# Patient Record
Sex: MALE | URBAN - METROPOLITAN AREA
[De-identification: names, ages, dates, MRNs, and addresses within clinical notes are randomized per-mention and may not be internally consistent; named-entity substitution may affect disease eponyms.]

---

## 2020-10-28 ENCOUNTER — OFFICE VISIT (OUTPATIENT)
Dept: URBAN - METROPOLITAN AREA CLINIC 27 | Facility: CLINIC | Age: 85
End: 2020-10-28
Payer: MEDICARE

## 2020-10-28 PROCEDURE — 67028 INJECTION EYE DRUG: CPT | Performed by: OPHTHALMOLOGY

## 2020-10-28 PROCEDURE — 92134 CPTRZ OPH DX IMG PST SGM RTA: CPT | Performed by: OPHTHALMOLOGY

## 2020-10-28 PROCEDURE — 92014 COMPRE OPH EXAM EST PT 1/>: CPT | Performed by: OPHTHALMOLOGY

## 2020-10-28 ASSESSMENT — INTRAOCULAR PRESSURE
OD: 13
OS: 14

## 2020-10-28 NOTE — IMPRESSION/PLAN
Impression: Macular degeneration, dry OD. Status: Symptomatic. Plan: The patient notes distortion. Exam and OCT reveal no IRF. An IVFA from 009/02/2020 demonstrated no leakage. Fundus Photos from 09/02/2020 demonstrated drusen. An ICGA from 05/06/2020 demonstrated no polyps. Observe. Discussed AREDS / I Vit and Marci.

## 2020-10-28 NOTE — IMPRESSION/PLAN
Impression: Visual distortion, OD. Status: Stable. s/p anesthesia needle injury (2005) Plan: Chronically poor [facility] OD. Observe.

## 2020-10-28 NOTE — IMPRESSION/PLAN
Impression: Macular degeneration, wet OS. Status: Symptomatic.
s/p Lucentis x 49 last 09/30/2020 (consented 06/17/2020) Plan: The patient notes continued distortion. Exam and OCT reveal a PED with mildly disrupted retinal architecture OS. An IVFA from 09/02/2020 demonstrated leakage in the central macula. Fundus Photos from 09/02/2020 demonstrated drusen. An ICGA from 05/06/2020 demonstrated polyps. Recommend Lucentis. R/B/A discussed with patient. Patient elects to proceed with Lucentis 0.5mg today. After 2% Subconjunctival anesthesia & Betadine prep, Lucentis was injected in the eye with no complications. Overfill discarded. Cold compress and Tylenol suggested for pain if needed. Thanks, Alison Ruiz Return in 1 month re eval Nv AMD, OCT OU, ICG OS 1st

## 2020-12-02 ENCOUNTER — OFFICE VISIT (OUTPATIENT)
Dept: URBAN - METROPOLITAN AREA CLINIC 27 | Facility: CLINIC | Age: 85
End: 2020-12-02
Payer: MEDICARE

## 2020-12-02 DIAGNOSIS — H04.123 DRY EYE SYNDROME OF BILATERAL LACRIMAL GLANDS: ICD-10-CM

## 2020-12-02 DIAGNOSIS — H35.3221 EXDTVE AGE-REL MCLR DEGN, LEFT EYE, WITH ACTV CHRDL NEOVAS: Primary | ICD-10-CM

## 2020-12-02 PROCEDURE — 92014 COMPRE OPH EXAM EST PT 1/>: CPT | Performed by: OPHTHALMOLOGY

## 2020-12-02 PROCEDURE — 92134 CPTRZ OPH DX IMG PST SGM RTA: CPT | Performed by: OPHTHALMOLOGY

## 2020-12-02 PROCEDURE — 67028 INJECTION EYE DRUG: CPT | Performed by: OPHTHALMOLOGY

## 2020-12-02 PROCEDURE — 92240 ICG ANGIOGRAPHY I&R UNI/BI: CPT | Performed by: OPHTHALMOLOGY

## 2020-12-02 ASSESSMENT — INTRAOCULAR PRESSURE
OS: 13
OD: 14

## 2020-12-02 NOTE — IMPRESSION/PLAN
Impression: Macular degeneration, wet OS. Status: Symptomatic.
s/p Lucentis x 50 last 10/28/2020 (consented 06/17/2020) Plan: Exam and OCT reveal a PED with mildly disrupted retinal architecture OS. An IVFA from 09/02/2020 demonstrated leakage in the central macula. Fundus Photos from 12/2/2020 demonstrated drusen. An ICGA from 12/2/2020 demonstrated polyps. Recommend Lucentis. R/B/A discussed with patient. Patient elects to proceed with Lucentis 0.5mg today. After 2% Subconjunctival anesthesia & Betadine prep, Lucentis was injected in the eye with no complications. Overfill discarded. Cold compress and Tylenol suggested for pain if needed. Thanks, Graham Mayorga Return in 1 month re eval Nv AMD, OCT OU.

## 2020-12-02 NOTE — IMPRESSION/PLAN
Impression: Macular degeneration, dry OD. Status: Symptomatic. Plan: The patient notes distortion. Exam and OCT reveal no IRF. An IVFA from 09/02/2020 demonstrated no leakage. Fundus Photos from 12/2/2020 demonstrated drusen. An ICGA from 12/2/2020 demonstrated no polyps. Observe. Discussed AREDS / I Vit and Dorisler.

## 2021-01-08 ENCOUNTER — OFFICE VISIT (OUTPATIENT)
Dept: URBAN - METROPOLITAN AREA CLINIC 27 | Facility: CLINIC | Age: 86
End: 2021-01-08
Payer: MEDICARE

## 2021-01-08 PROCEDURE — 92014 COMPRE OPH EXAM EST PT 1/>: CPT | Performed by: OPHTHALMOLOGY

## 2021-01-08 PROCEDURE — 67028 INJECTION EYE DRUG: CPT | Performed by: OPHTHALMOLOGY

## 2021-01-08 PROCEDURE — 92134 CPTRZ OPH DX IMG PST SGM RTA: CPT | Performed by: OPHTHALMOLOGY

## 2021-01-08 ASSESSMENT — INTRAOCULAR PRESSURE
OD: 12
OS: 13

## 2021-01-08 NOTE — IMPRESSION/PLAN
Impression: Macular degeneration, wet OS. Status: Symptomatic.
s/p Lucentis x 51 last 12/2/2020 (consented 06/17/2020) Plan: The patient notes blurring. Exam and OCT reveal a PED with mildly disrupted retinal architecture OS. An IVFA from 09/02/2020 demonstrated leakage in the central macula. Fundus Photos from 12/2/2020 demonstrated drusen. An ICGA from 12/2/2020 demonstrated polyps. Recommend Lucentis. R/B/A discussed with patient. Patient elects to proceed with Lucentis 0.5mg today. After 2% Subconjunctival anesthesia & Betadine prep, Lucentis was injected in the eye with no complications. Overfill discarded. Cold compress and Tylenol suggested for pain if needed. Thanks, Tuan Tse Return in 1 month re eval Nv AMD, OCT OU.

## 2021-02-05 ENCOUNTER — OFFICE VISIT (OUTPATIENT)
Dept: URBAN - METROPOLITAN AREA CLINIC 27 | Facility: CLINIC | Age: 86
End: 2021-02-05
Payer: MEDICARE

## 2021-02-05 DIAGNOSIS — H35.3114 NEXDTVE AGE-REL MCLR DEGN, R EYE, ADV ATRPC W SBFVL INVOLV: ICD-10-CM

## 2021-02-05 PROCEDURE — 92134 CPTRZ OPH DX IMG PST SGM RTA: CPT | Performed by: OPHTHALMOLOGY

## 2021-02-05 PROCEDURE — 67028 INJECTION EYE DRUG: CPT | Performed by: OPHTHALMOLOGY

## 2021-02-05 PROCEDURE — 92014 COMPRE OPH EXAM EST PT 1/>: CPT | Performed by: OPHTHALMOLOGY

## 2021-02-05 ASSESSMENT — INTRAOCULAR PRESSURE
OS: 14
OD: 12

## 2021-02-05 NOTE — IMPRESSION/PLAN
Impression: Macular degeneration, wet OS. Status: Symptomatic.
s/p Lucentis x 52 last 01/08/21 (consented 06/17/2020) Plan: The patient notes continued blurring. Exam and OCT reveal a PED with mildly disrupted retinal architecture OS. An IVFA from 09/02/2020 demonstrated leakage in the central macula. Fundus Photos from 12/2/2020 demonstrated drusen. An ICGA from 12/2/2020 demonstrated polyps. Recommend Lucentis. R/B/A discussed with patient. Patient elects to proceed with Lucentis 0.5mg today. After 2% Subconjunctival anesthesia & Betadine prep, Lucentis was injected in the eye with no complications. Overfill discarded. Cold compress and Tylenol suggested for pain if needed. Thanks, [employer] Return in 1 month re eval Nv AMD, OCT OU.

## 2021-03-05 ENCOUNTER — OFFICE VISIT (OUTPATIENT)
Dept: URBAN - METROPOLITAN AREA CLINIC 27 | Facility: CLINIC | Age: 86
End: 2021-03-05
Payer: MEDICARE

## 2021-03-05 DIAGNOSIS — H43.811 VITREOUS DEGENERATION, RIGHT EYE: ICD-10-CM

## 2021-03-05 DIAGNOSIS — H35.3211 EXUDATIVE AGE-RELATED MACULAR DEGENERATION, RIGHT EYE, WITH ACTIVE CHOROIDAL NEOVASCULARIZATION: ICD-10-CM

## 2021-03-05 PROCEDURE — 92014 COMPRE OPH EXAM EST PT 1/>: CPT | Performed by: OPHTHALMOLOGY

## 2021-03-05 PROCEDURE — 67028 INJECTION EYE DRUG: CPT | Performed by: OPHTHALMOLOGY

## 2021-03-05 PROCEDURE — 92134 CPTRZ OPH DX IMG PST SGM RTA: CPT | Performed by: OPHTHALMOLOGY

## 2021-03-05 ASSESSMENT — INTRAOCULAR PRESSURE
OD: 13
OS: 12

## 2021-04-09 ENCOUNTER — OFFICE VISIT (OUTPATIENT)
Dept: URBAN - METROPOLITAN AREA CLINIC 27 | Facility: CLINIC | Age: 86
End: 2021-04-09
Payer: MEDICARE

## 2021-04-09 DIAGNOSIS — Z96.1 PRESENCE OF INTRAOCULAR LENS: ICD-10-CM

## 2021-04-09 PROCEDURE — 92134 CPTRZ OPH DX IMG PST SGM RTA: CPT | Performed by: OPHTHALMOLOGY

## 2021-04-09 PROCEDURE — 67028 INJECTION EYE DRUG: CPT | Performed by: OPHTHALMOLOGY

## 2021-04-09 PROCEDURE — 92014 COMPRE OPH EXAM EST PT 1/>: CPT | Performed by: OPHTHALMOLOGY

## 2021-04-09 ASSESSMENT — INTRAOCULAR PRESSURE
OS: 13
OD: 10

## 2021-04-09 NOTE — IMPRESSION/PLAN
Impression: Macular degeneration, wet OS. Status: Symptomatic.
s/p Lucentis x 54 last 03/05/2021 (consented 06/17/2020) Plan: Exam and OCT reveal a PED with mildly disrupted retinal architecture OS. An IVFA from 09/02/2020 demonstrated leakage in the central macula. Fundus Photos from 12/2/2020 demonstrated drusen. An ICGA from 12/2/2020 demonstrated polyps. Recommend Lucentis. R/B/A discussed with patient. Patient elects to proceed with Lucentis 0.5mg today. After 2% Subconjunctival anesthesia & Betadine prep, Lucentis was injected in the eye with no complications. Overfill discarded. Cold compress and Tylenol suggested for pain if needed. Thanks, Kennedy Blush Return in 1 month re eval Nv AMD, OCT OU, possible Lucentis OS, IVFA OS 1st

## 2021-05-19 ENCOUNTER — OFFICE VISIT (OUTPATIENT)
Dept: URBAN - METROPOLITAN AREA CLINIC 27 | Facility: CLINIC | Age: 86
End: 2021-05-19
Payer: MEDICARE

## 2021-05-19 PROCEDURE — 67028 INJECTION EYE DRUG: CPT | Performed by: OPHTHALMOLOGY

## 2021-05-19 PROCEDURE — 92134 CPTRZ OPH DX IMG PST SGM RTA: CPT | Performed by: OPHTHALMOLOGY

## 2021-05-19 PROCEDURE — 92235 FLUORESCEIN ANGRPH MLTIFRAME: CPT | Performed by: OPHTHALMOLOGY

## 2021-05-19 PROCEDURE — 92014 COMPRE OPH EXAM EST PT 1/>: CPT | Performed by: OPHTHALMOLOGY

## 2021-05-19 ASSESSMENT — INTRAOCULAR PRESSURE
OD: 16
OS: 16

## 2021-05-19 NOTE — IMPRESSION/PLAN
Impression: Macular degeneration, dry OD. Status: Symptomatic. Plan: The patient notes distortion. Exam and OCT reveal no IRF. An IVFA from 05/19/2021 demonstrated no leakage. Fundus Photos from 05/19/2021 demonstrated drusen. An ICGA from 12/2/2020 demonstrated no polyps. Observe. Discussed AREDS / I Vit and Amsler.

## 2021-05-19 NOTE — IMPRESSION/PLAN
Impression: Macular degeneration, wet OS. Status: Symptomatic.
s/p Lucentis x 55 last 04/09/2021 Plan: The patient notes blurring. Exam and OCT reveal a PED with mildly disrupted retinal architecture OS. An IVFA from 05/19/2021 demonstrated leakage in the central macula. Fundus Photos from 05/19/2021 demonstrated drusen. An ICGA from 12/2/2020 demonstrated polyps. Recommend Lucentis. R/B/A discussed with patient. Patient elects to proceed with Lucentis 0.5mg today. After 2% Subconjunctival anesthesia & Betadine prep, Lucentis was injected in the eye with no complications. Overfill discarded. Cold compress and Tylenol suggested for pain if needed. Thanks, Tye Shows Return in 1 month re eval Nv AMD, OCT OU, possible Lucentis OS, ICG OS 1st

## 2021-07-02 ENCOUNTER — OFFICE VISIT (OUTPATIENT)
Dept: URBAN - METROPOLITAN AREA CLINIC 27 | Facility: CLINIC | Age: 86
End: 2021-07-02
Payer: MEDICARE

## 2021-07-02 PROCEDURE — 92014 COMPRE OPH EXAM EST PT 1/>: CPT | Performed by: OPHTHALMOLOGY

## 2021-07-02 PROCEDURE — 92134 CPTRZ OPH DX IMG PST SGM RTA: CPT | Performed by: OPHTHALMOLOGY

## 2021-07-02 PROCEDURE — 92240 ICG ANGIOGRAPHY I&R UNI/BI: CPT | Performed by: OPHTHALMOLOGY

## 2021-07-02 PROCEDURE — 67028 INJECTION EYE DRUG: CPT | Performed by: OPHTHALMOLOGY

## 2021-07-02 ASSESSMENT — INTRAOCULAR PRESSURE
OD: 12
OS: 10

## 2021-07-02 NOTE — IMPRESSION/PLAN
Impression: Macular degeneration, dry OD. Status: Symptomatic. Plan: The patient notes distortion. Exam and OCT reveal no IRF. An IVFA from 05/19/2021 demonstrated no leakage. Fundus Photos from 05/19/2021 demonstrated drusen. An ICGA from 07/02/2021 demonstrated no polyps. Observe. Discussed AREDS / I Vit and Amsler.

## 2021-07-02 NOTE — IMPRESSION/PLAN
Impression: Macular degeneration, wet OS. Status: Symptomatic.
s/p Lucentis x 56 last 05/19/2021 Plan: The patient notes continued blurring. Exam and OCT reveal a PED with mildly disrupted retinal architecture OS. An IVFA from 05/19/2021demonstrated leakage in the central macula. Fundus Photos from 05/19/2021 demonstrated drusen. An ICGA from 07/02/2021 demonstrated polyps. Recommend Lucentis. R/B/A discussed with patient. Patient elects to proceed with Lucentis 0.5mg today. After 2% Subconjunctival anesthesia & Betadine prep, Lucentis was injected in the eye with no complications. Overfill discarded. Cold compress and Tylenol suggested for pain if needed. Thanks, Monty Quiñones Return in 1 month re eval Nv AMD, OCT OU, possible Lucentis OS

## 2021-08-20 ENCOUNTER — OFFICE VISIT (OUTPATIENT)
Dept: URBAN - METROPOLITAN AREA CLINIC 27 | Facility: CLINIC | Age: 86
End: 2021-08-20
Payer: MEDICARE

## 2021-08-20 DIAGNOSIS — H53.10 SUBJECTIVE VISUAL DISTURBANCE: ICD-10-CM

## 2021-08-20 PROCEDURE — 67028 INJECTION EYE DRUG: CPT | Performed by: OPHTHALMOLOGY

## 2021-08-20 PROCEDURE — 92014 COMPRE OPH EXAM EST PT 1/>: CPT | Performed by: OPHTHALMOLOGY

## 2021-08-20 PROCEDURE — 92134 CPTRZ OPH DX IMG PST SGM RTA: CPT | Performed by: OPHTHALMOLOGY

## 2021-08-20 ASSESSMENT — INTRAOCULAR PRESSURE
OS: 12
OD: 14

## 2021-08-20 NOTE — IMPRESSION/PLAN
Impression: Macular degeneration, wet OS. Status: Symptomatic.
s/p Lucentis x 567last 07/02/2021 Plan: Exam and OCT reveal a PED with mildly disrupted retinal architecture OS. An IVFA from 05/19/2021demonstrated leakage in the central macula. Fundus Photos from 05/19/2021 demonstrated drusen. An ICGA from 07/02/2021 demonstrated polyps. Recommend Lucentis. R/B/A discussed with patient. Patient elects to proceed with Lucentis 0.5mg today. After 2% Subconjunctival anesthesia & Betadine prep, Lucentis was injected in the eye with no complications. Overfill discarded. Cold compress and Tylenol suggested for pain if needed. Thanks, Carlitos Child Return in 1 month re eval Nv AMD, OCT OU, possible Lucentis OS

## 2021-08-30 NOTE — IMPRESSION/PLAN
Impression: IVÁN ESCOBAR. Status: Symptomatic. Plan: AFTS. Review of Systems:  	•	CONSTITUTIONAL: no fever  	•	SKIN: no rash  	•	RESPIRATORY: no shortness of breath  	•	CARDIAC: no chest pain  	•	GI:  no abd pain, no nausea, no vomiting, no diarrhea  	•	GENITO-URINARY:  no dysuria  	•	MUSCULOSKELETAL:  no back pain  	•	NEUROLOGIC: no weakness  	•	ALLERGY: no rhinorrhea  	•	PSYCHIATRIC: +Hallucinations Review of Systems:  	•	CONSTITUTIONAL: no fever  	•	SKIN: no rash  	•	RESPIRATORY: no shortness of breath  	•	CARDIAC: no chest pain  	•	GI:  no abd pain, no nausea, no vomiting, no diarrhea  	•	GENITO-URINARY:  no dysuria  	•	MUSCULOSKELETAL:  no back pain  	•	NEUROLOGIC: no weakness  	•	ALLERGY: no rhinorrhea  	•	PSYCHIATRIC: drug abuse

## 2021-09-29 ENCOUNTER — OFFICE VISIT (OUTPATIENT)
Dept: URBAN - METROPOLITAN AREA CLINIC 27 | Facility: CLINIC | Age: 86
End: 2021-09-29
Payer: MEDICARE

## 2021-09-29 PROCEDURE — 92134 CPTRZ OPH DX IMG PST SGM RTA: CPT | Performed by: OPHTHALMOLOGY

## 2021-09-29 PROCEDURE — 67028 INJECTION EYE DRUG: CPT | Performed by: OPHTHALMOLOGY

## 2021-09-29 PROCEDURE — 92014 COMPRE OPH EXAM EST PT 1/>: CPT | Performed by: OPHTHALMOLOGY

## 2021-09-29 ASSESSMENT — INTRAOCULAR PRESSURE
OS: 14
OD: 13

## 2021-09-29 NOTE — IMPRESSION/PLAN
Impression: Macular degeneration, wet OS. Status: Symptomatic.
s/p multiple Lucentis last 08/20/2021 Plan: Exam and OCT reveal a PED OS. An IVFA from 05/19/2021demonstrated leakage in the central macula. Fundus Photos from 05/19/2021 demonstrated drusen. An ICGA from 07/02/2021 demonstrated polyps. Recommend Lucentis. R/B/A discussed with patient. Patient elects to proceed with Lucentis 0.5mg today. After 2% Subconjunctival anesthesia & Betadine prep, Lucentis was injected in the eye with no complications. Overfill discarded. Cold compress and Tylenol suggested for pain if needed. Thanks, Norval Boxer Return in 1 month re eval Nv AMD, OCT OU, possible Lucentis OS

## 2021-10-29 ENCOUNTER — OFFICE VISIT (OUTPATIENT)
Dept: URBAN - METROPOLITAN AREA CLINIC 27 | Facility: CLINIC | Age: 86
End: 2021-10-29
Payer: MEDICARE

## 2021-10-29 PROCEDURE — 92014 COMPRE OPH EXAM EST PT 1/>: CPT | Performed by: OPHTHALMOLOGY

## 2021-10-29 PROCEDURE — 92134 CPTRZ OPH DX IMG PST SGM RTA: CPT | Performed by: OPHTHALMOLOGY

## 2021-10-29 PROCEDURE — 67028 INJECTION EYE DRUG: CPT | Performed by: OPHTHALMOLOGY

## 2021-10-29 ASSESSMENT — INTRAOCULAR PRESSURE
OD: 15
OS: 17

## 2021-10-29 NOTE — IMPRESSION/PLAN
Impression: Macular degeneration, wet OS. Status: Symptomatic.
s/p multiple Lucentis last 09/29/2021  Plan: The patient notes blurring. Exam and OCT reveal a PED OS. An IVFA from 05/19/2021demonstrated leakage in the central macula. Fundus Photos from 05/19/2021 demonstrated drusen. An ICGA from 07/02/2021 demonstrated polyps. Recommend Lucentis. R/B/A discussed with patient. Patient elects to proceed with Lucentis 0.5mg today. After 2% Subconjunctival anesthesia & Betadine prep, Lucentis was injected in the eye with no complications. Overfill discarded. Cold compress and Tylenol suggested for pain if needed. Thanks, Tuan Tse Return in 1 month re eval Nv AMD, OCT OU, possible Lucentis OS

## 2022-01-14 ENCOUNTER — OFFICE VISIT (OUTPATIENT)
Dept: URBAN - METROPOLITAN AREA CLINIC 27 | Facility: CLINIC | Age: 87
End: 2022-01-14
Payer: MEDICARE

## 2022-01-14 PROCEDURE — 92134 CPTRZ OPH DX IMG PST SGM RTA: CPT | Performed by: OPHTHALMOLOGY

## 2022-01-14 PROCEDURE — 92014 COMPRE OPH EXAM EST PT 1/>: CPT | Performed by: OPHTHALMOLOGY

## 2022-01-14 PROCEDURE — 67028 INJECTION EYE DRUG: CPT | Performed by: OPHTHALMOLOGY

## 2022-01-14 ASSESSMENT — INTRAOCULAR PRESSURE
OS: 14
OD: 11

## 2022-01-14 NOTE — IMPRESSION/PLAN
Impression: Macular degeneration, wet OS. Status: Symptomatic.
s/p multiple Lucentis last 10/29/2021 Plan: The patient notes continued blurring. Exam and OCT reveal a PED OS. An IVFA from 05/19/2021demonstrated leakage in the central macula. Fundus Photos from 05/19/2021 demonstrated drusen. An ICGA from 07/02/2021 demonstrated polyps. Recommend Lucentis. R/B/A discussed with patient. Patient elects to proceed with Lucentis 0.5mg today. After 2% Subconjunctival anesthesia & Betadine prep, Lucentis was injected in the eye with no complications. Overfill discarded. Cold compress and Tylenol suggested for pain if needed. Thanks, Norval Boxer Return in 1 month re eval Nv AMD, OCT OU, possible Lucentis OS, IVFA OS 1st

## 2022-02-18 ENCOUNTER — OFFICE VISIT (OUTPATIENT)
Dept: URBAN - METROPOLITAN AREA CLINIC 27 | Facility: CLINIC | Age: 87
End: 2022-02-18
Payer: MEDICARE

## 2022-02-18 PROCEDURE — 67028 INJECTION EYE DRUG: CPT | Performed by: OPHTHALMOLOGY

## 2022-02-18 PROCEDURE — 92134 CPTRZ OPH DX IMG PST SGM RTA: CPT | Performed by: OPHTHALMOLOGY

## 2022-02-18 PROCEDURE — 92014 COMPRE OPH EXAM EST PT 1/>: CPT | Performed by: OPHTHALMOLOGY

## 2022-02-18 ASSESSMENT — INTRAOCULAR PRESSURE
OD: 12
OS: 13

## 2022-02-18 NOTE — IMPRESSION/PLAN
Impression: Macular degeneration, wet OS. Status: Symptomatic.
s/p multiple Lucentis last 01/14/022 Plan: Exam and OCT reveal a PED OS. An IVFA from 05/19/2021 leakage in the central macula. Fundus Photos from 05/19/2021 demonstrated drusen. An ICGA from 07/02/2021 demonstrated polyps. Recommend Lucentis. R/B/A discussed with patient. Patient elects to proceed with Lucentis 0.5mg today. After 2% Subconjunctival anesthesia & Betadine prep, Lucentis was injected in the eye with no complications. Overfill discarded. Cold compress and Tylenol suggested for pain if needed. Thanks, Sylvania Haver Return in 1 month re eval Nv AMD, OCT OU, possible Lucentis OS, IVFA OS 1st

## 2022-05-04 ENCOUNTER — OFFICE VISIT (OUTPATIENT)
Dept: URBAN - METROPOLITAN AREA CLINIC 27 | Facility: CLINIC | Age: 87
End: 2022-05-04
Payer: MEDICARE

## 2022-05-04 DIAGNOSIS — H04.123 DRY EYE SYNDROME OF BILATERAL LACRIMAL GLANDS: ICD-10-CM

## 2022-05-04 DIAGNOSIS — Z96.1 PRESENCE OF INTRAOCULAR LENS: ICD-10-CM

## 2022-05-04 DIAGNOSIS — H53.10 SUBJECTIVE VISUAL DISTURBANCE: ICD-10-CM

## 2022-05-04 DIAGNOSIS — H43.811 VITREOUS DEGENERATION, RIGHT EYE: ICD-10-CM

## 2022-05-04 DIAGNOSIS — H35.3221 EXDTVE AGE-REL MCLR DEGN, LEFT EYE, WITH ACTV CHRDL NEOVAS: Primary | ICD-10-CM

## 2022-05-04 DIAGNOSIS — H35.3114 NEXDTVE AGE-REL MCLR DEGN, R EYE, ADV ATRPC W SBFVL INVOLV: ICD-10-CM

## 2022-05-04 PROCEDURE — 92014 COMPRE OPH EXAM EST PT 1/>: CPT | Performed by: OPHTHALMOLOGY

## 2022-05-04 PROCEDURE — 67028 INJECTION EYE DRUG: CPT | Performed by: OPHTHALMOLOGY

## 2022-05-04 PROCEDURE — 92134 CPTRZ OPH DX IMG PST SGM RTA: CPT | Performed by: OPHTHALMOLOGY

## 2022-05-04 ASSESSMENT — INTRAOCULAR PRESSURE
OS: 16
OD: 18

## 2022-05-04 NOTE — IMPRESSION/PLAN
Impression: Macular degeneration, wet OS. Status: Symptomatic.
s/p multiple Lucentis last 02/18/2022 Plan: Exam and OCT reveal a PED OS. An IVFA from 05/04/2022 leakage in the central macula. Fundus Photos from 05/04/2022 demonstrated drusen. An ICGA from 07/02/2021 demonstrated polyps. Recommend Lucentis. R/B/A discussed with patient. Patient elects to proceed with Lucentis 0.5mg today. After 2% Subconjunctival anesthesia & Betadine prep, Lucentis was injected in the eye with no complications. Overfill discarded. Cold compress and Tylenol suggested for pain if needed. Thanks, Orlin Dior Return in 1 month re eval Nv AMD, OCT OU, possible Lucentis OS, ICG OS 1st

## 2022-05-04 NOTE — IMPRESSION/PLAN
Impression: Macular degeneration, dry OD. Status: Symptomatic. Plan: The patient notes distortion. Exam and OCT reveal no IRF. An IVFA from 05/04/2022 demonstrated no leakage. Fundus Photos from 05/04/2022 demonstrated drusen. An ICGA from 07/02/2021 demonstrated no polyps. Observe. Discussed AREDS / I Vit and Amsler.

## 2022-06-01 ENCOUNTER — OFFICE VISIT (OUTPATIENT)
Dept: URBAN - METROPOLITAN AREA CLINIC 27 | Facility: CLINIC | Age: 87
End: 2022-06-01
Payer: MEDICARE

## 2022-06-01 DIAGNOSIS — H35.3221 EXDTVE AGE-REL MCLR DEGN, LEFT EYE, WITH ACTV CHRDL NEOVAS: Primary | ICD-10-CM

## 2022-06-01 DIAGNOSIS — H35.3114 NEXDTVE AGE-REL MCLR DEGN, R EYE, ADV ATRPC W SBFVL INVOLV: ICD-10-CM

## 2022-06-01 DIAGNOSIS — H53.10 SUBJECTIVE VISUAL DISTURBANCE: ICD-10-CM

## 2022-06-01 DIAGNOSIS — H43.811 VITREOUS DEGENERATION, RIGHT EYE: ICD-10-CM

## 2022-06-01 DIAGNOSIS — H04.123 DRY EYE SYNDROME OF BILATERAL LACRIMAL GLANDS: ICD-10-CM

## 2022-06-01 DIAGNOSIS — Z96.1 PRESENCE OF INTRAOCULAR LENS: ICD-10-CM

## 2022-06-01 PROCEDURE — 92134 CPTRZ OPH DX IMG PST SGM RTA: CPT | Performed by: OPHTHALMOLOGY

## 2022-06-01 PROCEDURE — 92240 ICG ANGIOGRAPHY I&R UNI/BI: CPT | Performed by: OPHTHALMOLOGY

## 2022-06-01 PROCEDURE — 67028 INJECTION EYE DRUG: CPT | Performed by: OPHTHALMOLOGY

## 2022-06-01 PROCEDURE — 92014 COMPRE OPH EXAM EST PT 1/>: CPT | Performed by: OPHTHALMOLOGY

## 2022-06-01 ASSESSMENT — INTRAOCULAR PRESSURE
OD: 13
OS: 15

## 2022-06-29 ENCOUNTER — OFFICE VISIT (OUTPATIENT)
Dept: URBAN - METROPOLITAN AREA CLINIC 27 | Facility: CLINIC | Age: 87
End: 2022-06-29
Payer: MEDICARE

## 2022-06-29 DIAGNOSIS — H04.123 DRY EYE SYNDROME OF BILATERAL LACRIMAL GLANDS: ICD-10-CM

## 2022-06-29 DIAGNOSIS — Z96.1 PRESENCE OF INTRAOCULAR LENS: ICD-10-CM

## 2022-06-29 DIAGNOSIS — H53.10 SUBJECTIVE VISUAL DISTURBANCE: ICD-10-CM

## 2022-06-29 DIAGNOSIS — H43.811 VITREOUS DEGENERATION, RIGHT EYE: ICD-10-CM

## 2022-06-29 DIAGNOSIS — H35.3221 EXDTVE AGE-REL MCLR DEGN, LEFT EYE, WITH ACTV CHRDL NEOVAS: Primary | ICD-10-CM

## 2022-06-29 DIAGNOSIS — H35.3114 NEXDTVE AGE-REL MCLR DEGN, R EYE, ADV ATRPC W SBFVL INVOLV: ICD-10-CM

## 2022-06-29 PROCEDURE — 92134 CPTRZ OPH DX IMG PST SGM RTA: CPT | Performed by: OPHTHALMOLOGY

## 2022-06-29 PROCEDURE — 92014 COMPRE OPH EXAM EST PT 1/>: CPT | Performed by: OPHTHALMOLOGY

## 2022-06-29 PROCEDURE — 67028 INJECTION EYE DRUG: CPT | Performed by: OPHTHALMOLOGY

## 2022-06-29 ASSESSMENT — INTRAOCULAR PRESSURE
OS: 13
OD: 13

## 2022-06-29 NOTE — IMPRESSION/PLAN
Impression: Macular degeneration, wet OS. Status: Symptomatic.
s/p multiple Lucentis last 06/01/2022 Plan: The patient notes blurring. Exam and OCT reveal a PED OS. An IVFA from 05/04/2022 leakage in the central macula. Fundus Photos from 05/04/2022 demonstrated drusen. An ICGA from 06/01/2022 demonstrated polyps. Recommend Lucentis. R/B/A discussed with patient. Patient elects to proceed with Lucentis 0.5mg today. After 2% Subconjunctival anesthesia & Betadine prep, Lucentis was injected in the eye with no complications. Overfill discarded. Cold compress and Tylenol suggested for pain if needed. Thanks, Sebas Decker Return in 1 month re eval Nv AMD, OCT OU, possible Lucentis OS

## 2022-06-29 NOTE — IMPRESSION/PLAN
Impression: Macular degeneration, dry OD. Status: Symptomatic. Plan: The patient notes distortion. Exam and OCT reveal no IRF. An IVFA from 05/04/2022 demonstrated no leakage. Fundus Photos from 05/04/2022 demonstrated drusen. An ICGA from 06/01/2022 demonstrated no polyps. Observe. Discussed AREDS / I Vit and Amsler.

## 2022-07-27 ENCOUNTER — OFFICE VISIT (OUTPATIENT)
Dept: URBAN - METROPOLITAN AREA CLINIC 27 | Facility: CLINIC | Age: 87
End: 2022-07-27
Payer: MEDICARE

## 2022-07-27 DIAGNOSIS — H04.123 DRY EYE SYNDROME OF BILATERAL LACRIMAL GLANDS: ICD-10-CM

## 2022-07-27 DIAGNOSIS — H35.3221 EXDTVE AGE-REL MCLR DEGN, LEFT EYE, WITH ACTV CHRDL NEOVAS: Primary | ICD-10-CM

## 2022-07-27 DIAGNOSIS — H53.10 SUBJECTIVE VISUAL DISTURBANCE: ICD-10-CM

## 2022-07-27 DIAGNOSIS — Z96.1 PRESENCE OF INTRAOCULAR LENS: ICD-10-CM

## 2022-07-27 DIAGNOSIS — H35.3114 NEXDTVE AGE-REL MCLR DEGN, R EYE, ADV ATRPC W SBFVL INVOLV: ICD-10-CM

## 2022-07-27 DIAGNOSIS — H43.811 VITREOUS DEGENERATION, RIGHT EYE: ICD-10-CM

## 2022-07-27 PROCEDURE — 67028 INJECTION EYE DRUG: CPT | Performed by: OPHTHALMOLOGY

## 2022-07-27 PROCEDURE — 92014 COMPRE OPH EXAM EST PT 1/>: CPT | Performed by: OPHTHALMOLOGY

## 2022-07-27 PROCEDURE — 92134 CPTRZ OPH DX IMG PST SGM RTA: CPT | Performed by: OPHTHALMOLOGY

## 2022-07-27 ASSESSMENT — INTRAOCULAR PRESSURE
OD: 11
OS: 14

## 2022-07-27 NOTE — IMPRESSION/PLAN
Impression: Macular degeneration, wet OS. Status: Symptomatic.
s/p multiple Lucentis last 06/29/2022 Plan: The patient notes continued blurring. Exam and OCT reveal a PED OS. An IVFA from 05/04/2022 leakage in the central macula. Fundus Photos from 05/04/2022 demonstrated drusen. An ICGA from 06/01/2022 demonstrated polyps. Recommend Lucentis. R/B/A discussed with patient. Patient elects to proceed with Lucentis 0.5mg today. After 2% Subconjunctival anesthesia & Betadine prep, Lucentis was injected in the eye with no complications. Overfill discarded. Cold compress and Tylenol suggested for pain if needed. Thanks, Montserrat Mcgarry Return in 1 month re eval Nv AMD, OCT OU, possible Lucentis OS Name of MD Notified: (Please Specify)

## 2022-09-07 ENCOUNTER — OFFICE VISIT (OUTPATIENT)
Dept: URBAN - METROPOLITAN AREA CLINIC 27 | Facility: CLINIC | Age: 87
End: 2022-09-07
Payer: MEDICARE

## 2022-09-07 DIAGNOSIS — H43.811 VITREOUS DEGENERATION, RIGHT EYE: ICD-10-CM

## 2022-09-07 DIAGNOSIS — H35.3114 NEXDTVE AGE-REL MCLR DEGN, R EYE, ADV ATRPC W SBFVL INVOLV: ICD-10-CM

## 2022-09-07 DIAGNOSIS — H04.123 DRY EYE SYNDROME OF BILATERAL LACRIMAL GLANDS: ICD-10-CM

## 2022-09-07 DIAGNOSIS — H53.10 SUBJECTIVE VISUAL DISTURBANCE: ICD-10-CM

## 2022-09-07 DIAGNOSIS — Z96.1 PRESENCE OF INTRAOCULAR LENS: ICD-10-CM

## 2022-09-07 DIAGNOSIS — H35.3221 EXDTVE AGE-REL MCLR DEGN, LEFT EYE, WITH ACTV CHRDL NEOVAS: Primary | ICD-10-CM

## 2022-09-07 PROCEDURE — 92134 CPTRZ OPH DX IMG PST SGM RTA: CPT | Performed by: OPHTHALMOLOGY

## 2022-09-07 PROCEDURE — 92014 COMPRE OPH EXAM EST PT 1/>: CPT | Performed by: OPHTHALMOLOGY

## 2022-09-07 PROCEDURE — 67028 INJECTION EYE DRUG: CPT | Performed by: OPHTHALMOLOGY

## 2022-09-07 ASSESSMENT — INTRAOCULAR PRESSURE
OD: 15
OS: 14

## 2022-09-07 NOTE — IMPRESSION/PLAN
Impression: Macular degeneration, wet OS. Status: Symptomatic.
s/p multiple Lucentis last 07/27/2022 Plan: Exam and OCT reveal a PED OS. An IVFA from 05/04/2022 leakage in the central macula. Fundus Photos from 05/04/2022 demonstrated drusen. An ICGA from 06/01/2022 demonstrated polyps. Recommend Lucentis. R/B/A discussed with patient. Patient elects to proceed with Lucentis 0.5mg today. After 2% Subconjunctival anesthesia & Betadine prep, Lucentis was injected in the eye with no complications. Overfill discarded. Cold compress and Tylenol suggested for pain if needed. Thanks, Tuan Tse Return in 1 month re eval Nv AMD, OCT OU, possible Lucentis OS

## 2022-10-26 ENCOUNTER — OFFICE VISIT (OUTPATIENT)
Dept: URBAN - METROPOLITAN AREA CLINIC 27 | Facility: CLINIC | Age: 87
End: 2022-10-26
Payer: MEDICARE

## 2022-10-26 DIAGNOSIS — H04.123 DRY EYE SYNDROME OF BILATERAL LACRIMAL GLANDS: ICD-10-CM

## 2022-10-26 DIAGNOSIS — H35.3114 NEXDTVE AGE-REL MCLR DEGN, R EYE, ADV ATRPC W SBFVL INVOLV: ICD-10-CM

## 2022-10-26 DIAGNOSIS — H43.811 VITREOUS DEGENERATION, RIGHT EYE: ICD-10-CM

## 2022-10-26 DIAGNOSIS — H35.3221 EXDTVE AGE-REL MCLR DEGN, LEFT EYE, WITH ACTV CHRDL NEOVAS: Primary | ICD-10-CM

## 2022-10-26 DIAGNOSIS — H53.10 SUBJECTIVE VISUAL DISTURBANCE: ICD-10-CM

## 2022-10-26 DIAGNOSIS — Z96.1 PRESENCE OF INTRAOCULAR LENS: ICD-10-CM

## 2022-10-26 PROCEDURE — 92014 COMPRE OPH EXAM EST PT 1/>: CPT | Performed by: OPHTHALMOLOGY

## 2022-10-26 PROCEDURE — 92134 CPTRZ OPH DX IMG PST SGM RTA: CPT | Performed by: OPHTHALMOLOGY

## 2022-10-26 PROCEDURE — 67028 INJECTION EYE DRUG: CPT | Performed by: OPHTHALMOLOGY

## 2022-10-26 ASSESSMENT — INTRAOCULAR PRESSURE
OD: 14
OS: 15

## 2022-10-26 NOTE — IMPRESSION/PLAN
Impression: Macular degeneration, wet OS. Status: Symptomatic.
s/p multiple Lucentis last 09/07/2022 Plan: The patient notes blurring. Exam and OCT reveal a PED OS. An IVFA from 05/04/2022 leakage in the central macula. Fundus Photos from 05/04/2022 demonstrated drusen. An ICGA from 06/01/2022 demonstrated polyps. Recommend Lucentis. R/B/A discussed with patient. Patient elects to proceed with Lucentis 0.5mg today. After 2% Subconjunctival anesthesia & Betadine prep, Lucentis was injected in the eye with no complications. Overfill discarded. Cold compress and Tylenol suggested for pain if needed. Thanks, Maame Gonzalez Return in 1 month re eval Nv AMD, OCT OU, possible Lucentis OS, IVFA OS 1st

## 2022-11-30 ENCOUNTER — OFFICE VISIT (OUTPATIENT)
Dept: URBAN - METROPOLITAN AREA CLINIC 27 | Facility: CLINIC | Age: 87
End: 2022-11-30
Payer: MEDICARE

## 2022-11-30 DIAGNOSIS — H53.10 SUBJECTIVE VISUAL DISTURBANCE: ICD-10-CM

## 2022-11-30 DIAGNOSIS — Z96.1 PRESENCE OF INTRAOCULAR LENS: ICD-10-CM

## 2022-11-30 DIAGNOSIS — H35.3114 NEXDTVE AGE-REL MCLR DEGN, R EYE, ADV ATRPC W SBFVL INVOLV: ICD-10-CM

## 2022-11-30 DIAGNOSIS — H04.123 DRY EYE SYNDROME OF BILATERAL LACRIMAL GLANDS: ICD-10-CM

## 2022-11-30 DIAGNOSIS — H43.811 VITREOUS DEGENERATION, RIGHT EYE: ICD-10-CM

## 2022-11-30 DIAGNOSIS — H35.3221 EXDTVE AGE-REL MCLR DEGN, LEFT EYE, WITH ACTV CHRDL NEOVAS: Primary | ICD-10-CM

## 2022-11-30 PROCEDURE — 67028 INJECTION EYE DRUG: CPT | Performed by: OPHTHALMOLOGY

## 2022-11-30 PROCEDURE — 92014 COMPRE OPH EXAM EST PT 1/>: CPT | Performed by: OPHTHALMOLOGY

## 2022-11-30 PROCEDURE — 92235 FLUORESCEIN ANGRPH MLTIFRAME: CPT | Performed by: OPHTHALMOLOGY

## 2022-11-30 PROCEDURE — 92134 CPTRZ OPH DX IMG PST SGM RTA: CPT | Performed by: OPHTHALMOLOGY

## 2022-11-30 ASSESSMENT — INTRAOCULAR PRESSURE
OD: 11
OS: 11

## 2022-11-30 NOTE — IMPRESSION/PLAN
Impression: Macular degeneration, dry OD. Status: Symptomatic. Plan: The patient notes distortion. Exam and OCT reveal no IRF. An IVFA from 11/30/2022 demonstrated no leakage. Fundus Photos from 11/30/2022 demonstrated drusen. An ICGA from 06/01/2022 demonstrated no polyps. Observe. Discussed AREDS / I Vit and Amsler.

## 2022-11-30 NOTE — IMPRESSION/PLAN
Impression: Macular degeneration, wet OS. Status: Symptomatic.
s/p multiple Lucentis last 10/26/2022 Plan: The patient notes continued blurring. Exam and OCT reveal a PED OS. An IVFA from 11/30/2022 leakage in the central macula. Fundus Photos from 11/30/2022 demonstrated drusen. An ICGA from 06/01/2022 demonstrated polyps. Recommend Lucentis. R/B/A discussed with patient. Patient elects to proceed with Lucentis 0.5mg today. After 2% Subconjunctival anesthesia & Betadine prep, Lucentis was injected in the eye with no complications. Overfill discarded. Cold compress and Tylenol suggested for pain if needed. Thanks, Roberto Diaz Return in 1 month re eval Nv AMD, OCT OU, possible Lucentis OS, ICG OS 1st

## 2023-01-18 ENCOUNTER — OFFICE VISIT (OUTPATIENT)
Dept: URBAN - METROPOLITAN AREA CLINIC 27 | Facility: CLINIC | Age: 88
End: 2023-01-18
Payer: MEDICARE

## 2023-01-18 DIAGNOSIS — H35.3221 EXDTVE AGE-REL MCLR DEGN, LEFT EYE, WITH ACTV CHRDL NEOVAS: Primary | ICD-10-CM

## 2023-01-18 DIAGNOSIS — H43.811 VITREOUS DEGENERATION, RIGHT EYE: ICD-10-CM

## 2023-01-18 DIAGNOSIS — H53.10 SUBJECTIVE VISUAL DISTURBANCE: ICD-10-CM

## 2023-01-18 DIAGNOSIS — H04.123 DRY EYE SYNDROME OF BILATERAL LACRIMAL GLANDS: ICD-10-CM

## 2023-01-18 DIAGNOSIS — Z96.1 PRESENCE OF INTRAOCULAR LENS: ICD-10-CM

## 2023-01-18 DIAGNOSIS — H35.3114 NEXDTVE AGE-REL MCLR DEGN, R EYE, ADV ATRPC W SBFVL INVOLV: ICD-10-CM

## 2023-01-18 PROCEDURE — 92134 CPTRZ OPH DX IMG PST SGM RTA: CPT | Performed by: OPHTHALMOLOGY

## 2023-01-18 PROCEDURE — 92240 ICG ANGIOGRAPHY I&R UNI/BI: CPT | Performed by: OPHTHALMOLOGY

## 2023-01-18 PROCEDURE — 67028 INJECTION EYE DRUG: CPT | Performed by: OPHTHALMOLOGY

## 2023-01-18 PROCEDURE — 92014 COMPRE OPH EXAM EST PT 1/>: CPT | Performed by: OPHTHALMOLOGY

## 2023-01-18 ASSESSMENT — INTRAOCULAR PRESSURE
OD: 14
OS: 14

## 2023-01-18 NOTE — IMPRESSION/PLAN
Impression: Macular degeneration, wet OS. Status: Symptomatic.
s/p multiple Lucentis last 11/30/2022  Plan: Exam and OCT reveal a PED OS. An IVFA from 11/30/2022 leakage in the central macula. Fundus Photos from 11/30/2022 demonstrated drusen. An ICGA from 01/18/2023 demonstrated polyps. Recommend Lucentis. R/B/A discussed with patient. Patient elects to proceed with Lucentis 0.5mg today. After 2% Subconjunctival anesthesia & Betadine prep, Lucentis was injected in the eye with no complications. Overfill discarded. Cold compress and Tylenol suggested for pain if needed. Thanks, Montserrat Mcgarry Return in 1 month re eval Nv AMD, OCT OU, possible Lucentis OS

## 2023-01-18 NOTE — IMPRESSION/PLAN
Impression: Macular degeneration, dry OD. Status: Symptomatic. Plan: The patient notes distortion. Exam and OCT reveal no IRF. An IVFA from 11/30/2022 demonstrated no leakage. Fundus Photos from 11/30/2022 demonstrated drusen. An ICGA from 01/18/2023 demonstrated no polyps. Observe. Discussed AREDS / I Vit and Amsler.

## 2023-03-01 ENCOUNTER — OFFICE VISIT (OUTPATIENT)
Dept: URBAN - METROPOLITAN AREA CLINIC 27 | Facility: CLINIC | Age: 88
End: 2023-03-01
Payer: MEDICARE

## 2023-03-01 DIAGNOSIS — H35.3114 NEXDTVE AGE-REL MCLR DEGN, R EYE, ADV ATRPC W SBFVL INVOLV: ICD-10-CM

## 2023-03-01 DIAGNOSIS — H35.3221 EXDTVE AGE-REL MCLR DEGN, LEFT EYE, WITH ACTV CHRDL NEOVAS: Primary | ICD-10-CM

## 2023-03-01 DIAGNOSIS — H43.811 VITREOUS DEGENERATION, RIGHT EYE: ICD-10-CM

## 2023-03-01 DIAGNOSIS — H53.10 SUBJECTIVE VISUAL DISTURBANCE: ICD-10-CM

## 2023-03-01 DIAGNOSIS — H04.123 DRY EYE SYNDROME OF BILATERAL LACRIMAL GLANDS: ICD-10-CM

## 2023-03-01 DIAGNOSIS — Z96.1 PRESENCE OF INTRAOCULAR LENS: ICD-10-CM

## 2023-03-01 PROCEDURE — 92134 CPTRZ OPH DX IMG PST SGM RTA: CPT | Performed by: OPHTHALMOLOGY

## 2023-03-01 PROCEDURE — 92014 COMPRE OPH EXAM EST PT 1/>: CPT | Performed by: OPHTHALMOLOGY

## 2023-03-01 PROCEDURE — 67028 INJECTION EYE DRUG: CPT | Performed by: OPHTHALMOLOGY

## 2023-03-01 ASSESSMENT — INTRAOCULAR PRESSURE
OS: 19
OD: 12

## 2023-03-01 NOTE — IMPRESSION/PLAN
Impression: Macular degeneration, wet OS. Status: Symptomatic.
s/p multiple Lucentis last 01/18/2023 Plan: Exam and OCT reveal a PED OS. An IVFA from 11/30/2022 leakage in the central macula. Fundus Photos from 11/30/2022 demonstrated drusen. An ICGA from 01/18/2023 demonstrated polyps. Recommend Lucentis. R/B/A discussed with patient. Patient elects to proceed with Lucentis 0.5mg today. After 2% Subconjunctival anesthesia & Betadine prep, Lucentis was injected in the eye with no complications. Overfill discarded. Cold compress and Tylenol suggested for pain if needed. Thanks, Montserrat Mcgarry Return in 1 month re eval Nv AMD, OCT OU, possible Lucentis OS

## 2023-04-26 ENCOUNTER — OFFICE VISIT (OUTPATIENT)
Dept: URBAN - METROPOLITAN AREA CLINIC 27 | Facility: CLINIC | Age: 88
End: 2023-04-26
Payer: MEDICARE

## 2023-04-26 DIAGNOSIS — H43.811 VITREOUS DEGENERATION, RIGHT EYE: ICD-10-CM

## 2023-04-26 DIAGNOSIS — H35.3114 NEXDTVE AGE-REL MCLR DEGN, R EYE, ADV ATRPC W SBFVL INVOLV: ICD-10-CM

## 2023-04-26 DIAGNOSIS — H04.123 DRY EYE SYNDROME OF BILATERAL LACRIMAL GLANDS: ICD-10-CM

## 2023-04-26 DIAGNOSIS — Z96.1 PRESENCE OF INTRAOCULAR LENS: ICD-10-CM

## 2023-04-26 DIAGNOSIS — H53.10 SUBJECTIVE VISUAL DISTURBANCE: ICD-10-CM

## 2023-04-26 DIAGNOSIS — H35.3221 EXDTVE AGE-REL MCLR DEGN, LEFT EYE, WITH ACTV CHRDL NEOVAS: Primary | ICD-10-CM

## 2023-04-26 PROCEDURE — 67028 INJECTION EYE DRUG: CPT | Performed by: OPHTHALMOLOGY

## 2023-04-26 PROCEDURE — 92134 CPTRZ OPH DX IMG PST SGM RTA: CPT | Performed by: OPHTHALMOLOGY

## 2023-04-26 PROCEDURE — 92014 COMPRE OPH EXAM EST PT 1/>: CPT | Performed by: OPHTHALMOLOGY

## 2023-04-26 ASSESSMENT — INTRAOCULAR PRESSURE
OS: 22
OD: 20

## 2023-04-26 NOTE — IMPRESSION/PLAN
Impression: Macular degeneration, wet OS. Status: Symptomatic.
s/p multiple Lucentis last 03/01/2023  Plan: Exam and OCT reveal a PED OS. An IVFA from 11/30/2022 leakage in the central macula. Fundus Photos from 11/30/2022 demonstrated drusen. An ICGA from 01/18/2023 demonstrated polyps. Recommend Lucentis. R/B/A discussed with patient. Patient elects to proceed with Lucentis 0.5mg today. After 2% Subconjunctival anesthesia & Betadine prep, Lucentis was injected in the eye with no complications. Overfill discarded. Cold compress and Tylenol suggested for pain if needed. Thanks, Monty Quiñones Return in 4-6 weeks re eval Nv AMD, OCT OU, possible Lucentis OS

## 2023-04-26 NOTE — IMPRESSION/PLAN
Impression: Macular degeneration, dry OD. Status: Symptomatic. Plan: The patient notes distortion. Exam and OCT reveal no IRF. An IVFA from 11/30/2022 demonstrated no leakage. Fundus Photos from 11/30/2022 demonstrated drusen. An ICGA from 01/18/2023 demonstrated no polyps. Observe. Discussed AREDS carotenoids/ I Vit and Amsler.

## 2023-05-24 ENCOUNTER — OFFICE VISIT (OUTPATIENT)
Dept: URBAN - METROPOLITAN AREA CLINIC 27 | Facility: CLINIC | Age: 88
End: 2023-05-24
Payer: MEDICARE

## 2023-05-24 DIAGNOSIS — Z96.1 PRESENCE OF INTRAOCULAR LENS: ICD-10-CM

## 2023-05-24 DIAGNOSIS — H35.3114 NEXDTVE AGE-REL MCLR DEGN, R EYE, ADV ATRPC W SBFVL INVOLV: ICD-10-CM

## 2023-05-24 DIAGNOSIS — H53.10 SUBJECTIVE VISUAL DISTURBANCE: ICD-10-CM

## 2023-05-24 DIAGNOSIS — H35.3221 EXDTVE AGE-REL MCLR DEGN, LEFT EYE, WITH ACTV CHRDL NEOVAS: Primary | ICD-10-CM

## 2023-05-24 DIAGNOSIS — H04.123 DRY EYE SYNDROME OF BILATERAL LACRIMAL GLANDS: ICD-10-CM

## 2023-05-24 DIAGNOSIS — H43.811 VITREOUS DEGENERATION, RIGHT EYE: ICD-10-CM

## 2023-05-24 PROCEDURE — 92014 COMPRE OPH EXAM EST PT 1/>: CPT | Performed by: OPHTHALMOLOGY

## 2023-05-24 PROCEDURE — 67028 INJECTION EYE DRUG: CPT | Performed by: OPHTHALMOLOGY

## 2023-05-24 PROCEDURE — 92134 CPTRZ OPH DX IMG PST SGM RTA: CPT | Performed by: OPHTHALMOLOGY

## 2023-05-24 ASSESSMENT — INTRAOCULAR PRESSURE
OS: 17
OD: 14

## 2023-05-24 NOTE — IMPRESSION/PLAN
Impression: Macular degeneration, wet OS. Status: Symptomatic.
s/p multiple Lucentis last 04/26/2023 Plan: Exam and OCT reveal a PED OS. An IVFA from 11/30/2022 leakage in the central macula. Fundus Photos from 11/30/2022 demonstrated drusen. An ICGA from 01/18/2023 demonstrated polyps. Recommend Lucentis. R/B/A discussed with patient. Patient elects to proceed with Lucentis 0.5mg today. After 2% Subconjunctival anesthesia & Betadine prep, Lucentis was injected in the eye with no complications. Overfill discarded. Cold compress and Tylenol suggested for pain if needed. Thanks, Tahminaroni Pagan Return in 6 weeks re eval Nv AMD, OCT OU, possible Lucentis OS

## 2023-07-20 ENCOUNTER — OFFICE VISIT (OUTPATIENT)
Dept: URBAN - METROPOLITAN AREA CLINIC 27 | Facility: CLINIC | Age: 88
End: 2023-07-20
Payer: MEDICARE

## 2023-07-20 DIAGNOSIS — Z96.1 PRESENCE OF INTRAOCULAR LENS: ICD-10-CM

## 2023-07-20 DIAGNOSIS — H35.3221 EXDTVE AGE-REL MCLR DEGN, LEFT EYE, WITH ACTV CHRDL NEOVAS: Primary | ICD-10-CM

## 2023-07-20 DIAGNOSIS — H04.123 DRY EYE SYNDROME OF BILATERAL LACRIMAL GLANDS: ICD-10-CM

## 2023-07-20 DIAGNOSIS — H43.811 VITREOUS DEGENERATION, RIGHT EYE: ICD-10-CM

## 2023-07-20 DIAGNOSIS — H35.3114 NEXDTVE AGE-REL MCLR DEGN, R EYE, ADV ATRPC W SBFVL INVOLV: ICD-10-CM

## 2023-07-20 DIAGNOSIS — H53.10 SUBJECTIVE VISUAL DISTURBANCE: ICD-10-CM

## 2023-07-20 PROCEDURE — 67028 INJECTION EYE DRUG: CPT | Performed by: OPHTHALMOLOGY

## 2023-07-20 PROCEDURE — 92134 CPTRZ OPH DX IMG PST SGM RTA: CPT | Performed by: OPHTHALMOLOGY

## 2023-07-20 PROCEDURE — 92014 COMPRE OPH EXAM EST PT 1/>: CPT | Performed by: OPHTHALMOLOGY

## 2023-07-20 ASSESSMENT — INTRAOCULAR PRESSURE
OD: 15
OS: 16

## 2023-07-20 NOTE — IMPRESSION/PLAN
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
-s/p Lucentis 05/24/2023-DYK Plan: Exam and OCT reveal a PED OS. An IVFA from 11/30/2022 leakage in the central macula. Fundus Photos from 11/30/2022 demonstrated drusen. An ICGA from 01/18/2023 demonstrated polyps. Recommend intravitreal Lucentis today and switch to [address]. R/B/A discussed and patient elects to proceed. Intravitreal Lucentis injection was administered today OS without complication.   

RTC 6-8 wks OCT OU; re-eval with possible [place] Skin normal color for race, warm, dry and intact. No evidence of rash.

## 2023-07-20 NOTE — IMPRESSION/PLAN
Impression: Subjective visual disturbance H53.10
-s/p anesthesia needle injury (2005) Plan: Chronically poor [facility] OD. Observe.

## 2023-07-20 NOTE — IMPRESSION/PLAN
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 The diagnosis, natural history, and prognosis of dry AMD with GA were discussed at length.

## 2023-07-20 NOTE — IMPRESSION/PLAN
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 Exam demonstrates a PVD without any RD or RT on exam.  Discussed R/B/A of PPV vs observation for the floaters. The floaters are not debilitating and so observation was recommended. Reassurance provided. RDW discussed. Precautions discussed with the patient.

## 2023-08-31 ENCOUNTER — OFFICE VISIT (OUTPATIENT)
Dept: URBAN - METROPOLITAN AREA CLINIC 27 | Facility: CLINIC | Age: 88
End: 2023-08-31
Payer: MEDICARE

## 2023-08-31 DIAGNOSIS — H35.3221 EXDTVE AGE-REL MCLR DEGN, LEFT EYE, WITH ACTV CHRDL NEOVAS: Primary | ICD-10-CM

## 2023-08-31 DIAGNOSIS — H53.10 SUBJECTIVE VISUAL DISTURBANCE: ICD-10-CM

## 2023-08-31 DIAGNOSIS — Z96.1 PRESENCE OF INTRAOCULAR LENS: ICD-10-CM

## 2023-08-31 DIAGNOSIS — H43.811 VITREOUS DEGENERATION, RIGHT EYE: ICD-10-CM

## 2023-08-31 DIAGNOSIS — H04.123 DRY EYE SYNDROME OF BILATERAL LACRIMAL GLANDS: ICD-10-CM

## 2023-08-31 DIAGNOSIS — H35.3114 NEXDTVE AGE-REL MCLR DEGN, R EYE, ADV ATRPC W SBFVL INVOLV: ICD-10-CM

## 2023-08-31 PROCEDURE — 92134 CPTRZ OPH DX IMG PST SGM RTA: CPT | Performed by: OPHTHALMOLOGY

## 2023-08-31 PROCEDURE — 67028 INJECTION EYE DRUG: CPT | Performed by: OPHTHALMOLOGY

## 2023-08-31 PROCEDURE — 99214 OFFICE O/P EST MOD 30 MIN: CPT | Performed by: OPHTHALMOLOGY

## 2023-08-31 ASSESSMENT — INTRAOCULAR PRESSURE
OD: 13
OS: 12

## 2023-10-26 ENCOUNTER — OFFICE VISIT (OUTPATIENT)
Dept: URBAN - METROPOLITAN AREA CLINIC 27 | Facility: CLINIC | Age: 88
End: 2023-10-26
Payer: MEDICARE

## 2023-10-26 DIAGNOSIS — H43.811 VITREOUS DEGENERATION, RIGHT EYE: ICD-10-CM

## 2023-10-26 DIAGNOSIS — H04.123 DRY EYE SYNDROME OF BILATERAL LACRIMAL GLANDS: ICD-10-CM

## 2023-10-26 DIAGNOSIS — H35.3221 EXDTVE AGE-REL MCLR DEGN, LEFT EYE, WITH ACTV CHRDL NEOVAS: Primary | ICD-10-CM

## 2023-10-26 DIAGNOSIS — Z96.1 PRESENCE OF INTRAOCULAR LENS: ICD-10-CM

## 2023-10-26 DIAGNOSIS — H53.10 SUBJECTIVE VISUAL DISTURBANCE: ICD-10-CM

## 2023-10-26 DIAGNOSIS — H35.3114 NEXDTVE AGE-REL MCLR DEGN, R EYE, ADV ATRPC W SBFVL INVOLV: ICD-10-CM

## 2023-10-26 PROCEDURE — 92134 CPTRZ OPH DX IMG PST SGM RTA: CPT | Performed by: OPHTHALMOLOGY

## 2023-10-26 PROCEDURE — 67028 INJECTION EYE DRUG: CPT | Performed by: OPHTHALMOLOGY

## 2023-10-26 PROCEDURE — 92012 INTRM OPH EXAM EST PATIENT: CPT | Performed by: OPHTHALMOLOGY

## 2023-10-26 ASSESSMENT — INTRAOCULAR PRESSURE
OD: 16
OS: 16

## 2023-11-24 NOTE — IMPRESSION/PLAN
Impression: IVÁN ESCOBAR. Status: Symptomatic. Plan: AFTS.
Impression: Macular degeneration, dry OD. Status: Symptomatic. Plan: The patient notes distortion. Exam and OCT reveal no IRF. An IVFA from 05/04/2022 demonstrated no leakage. Fundus Photos from 05/04/2022 demonstrated drusen. An ICGA from 06/01/2022 demonstrated no polyps. Observe. Discussed AREDS / I Vit and Amsler.
Impression: Macular degeneration, wet OS. Status: Symptomatic.
s/p multiple Lucentis last 05/04/2022 Plan: Exam and OCT reveal a PED OS. An IVFA from 05/04/2022 leakage in the central macula. Fundus Photos from 05/04/2022 demonstrated drusen. An ICGA from 06/01/2022 demonstrated polyps. Recommend Lucentis. R/B/A discussed with patient. Patient elects to proceed with Lucentis 0.5mg today. After 2% Subconjunctival anesthesia & Betadine prep, Lucentis was injected in the eye with no complications. Overfill discarded. Cold compress and Tylenol suggested for pain if needed. Thanks, Tye Shows Return in 1 month re eval Nv AMD, OCT OU, possible Lucentis OS,
Impression: PCIOL, OU. Status: Stable. s/p YAG, OS Plan: Stable.
Impression: PVD, OD. Status: Symptomatic. Plan: RDW.
Impression: Visual distortion, OD. Status: Stable. s/p anesthesia needle injury (2005) Plan: Chronically poor [facility] OD. Observe.
show

## 2024-01-05 ENCOUNTER — OFFICE VISIT (OUTPATIENT)
Dept: URBAN - METROPOLITAN AREA CLINIC 41 | Facility: CLINIC | Age: 89
End: 2024-01-05
Payer: MEDICARE

## 2024-01-05 DIAGNOSIS — H35.3221 EXUDATIVE AGE-RELATED MACULAR DEGENERATION, LEFT EYE, WITH ACTIVE CHOROIDAL NEOVASCULARIZATION: Primary | ICD-10-CM

## 2024-01-05 DIAGNOSIS — Z96.1 PRESENCE OF INTRAOCULAR LENS: ICD-10-CM

## 2024-01-05 DIAGNOSIS — H53.10 SUBJECTIVE VISUAL DISTURBANCE: ICD-10-CM

## 2024-01-05 DIAGNOSIS — H35.3114 NEXDTVE AGE-REL MCLR DEGN, R EYE, ADV ATRPC W SBFVL INVOLV: ICD-10-CM

## 2024-01-05 PROCEDURE — 67028 INJECTION EYE DRUG: CPT | Performed by: OPHTHALMOLOGY

## 2024-01-05 PROCEDURE — 99214 OFFICE O/P EST MOD 30 MIN: CPT | Performed by: OPHTHALMOLOGY

## 2024-01-05 PROCEDURE — 92134 CPTRZ OPH DX IMG PST SGM RTA: CPT | Performed by: OPHTHALMOLOGY

## 2024-01-05 ASSESSMENT — INTRAOCULAR PRESSURE
OS: 12
OD: 12

## 2024-03-22 ENCOUNTER — OFFICE VISIT (OUTPATIENT)
Dept: URBAN - METROPOLITAN AREA CLINIC 41 | Facility: CLINIC | Age: 89
End: 2024-03-22
Payer: MEDICARE

## 2024-03-22 DIAGNOSIS — H35.3221 EXDTVE AGE-REL MCLR DEGN, LEFT EYE, WITH ACTV CHRDL NEOVAS: Primary | ICD-10-CM

## 2024-03-22 DIAGNOSIS — H53.10 SUBJECTIVE VISUAL DISTURBANCE: ICD-10-CM

## 2024-03-22 DIAGNOSIS — Z96.1 PRESENCE OF INTRAOCULAR LENS: ICD-10-CM

## 2024-03-22 DIAGNOSIS — H35.3114 NEXDTVE AGE-REL MCLR DEGN, R EYE, ADV ATRPC W SBFVL INVOLV: ICD-10-CM

## 2024-03-22 PROCEDURE — 67028 INJECTION EYE DRUG: CPT | Performed by: OPHTHALMOLOGY

## 2024-03-22 PROCEDURE — 92134 CPTRZ OPH DX IMG PST SGM RTA: CPT | Performed by: OPHTHALMOLOGY

## 2024-03-22 PROCEDURE — 99214 OFFICE O/P EST MOD 30 MIN: CPT | Performed by: OPHTHALMOLOGY

## 2024-03-22 ASSESSMENT — INTRAOCULAR PRESSURE
OS: 16
OD: 14

## 2024-06-14 ENCOUNTER — OFFICE VISIT (OUTPATIENT)
Dept: URBAN - METROPOLITAN AREA CLINIC 41 | Facility: CLINIC | Age: 89
End: 2024-06-14
Payer: MEDICARE

## 2024-06-14 DIAGNOSIS — Z96.1 PRESENCE OF INTRAOCULAR LENS: ICD-10-CM

## 2024-06-14 DIAGNOSIS — H35.3114 NEXDTVE AGE-REL MCLR DEGN, R EYE, ADV ATRPC W SBFVL INVOLV: ICD-10-CM

## 2024-06-14 DIAGNOSIS — H35.3221 EXUDATIVE AGE-RELATED MACULAR DEGENERATION, LEFT EYE, WITH ACTIVE CHOROIDAL NEOVASCULARIZATION: Primary | ICD-10-CM

## 2024-06-14 DIAGNOSIS — H53.10 SUBJECTIVE VISUAL DISTURBANCE: ICD-10-CM

## 2024-06-14 PROCEDURE — 92134 CPTRZ OPH DX IMG PST SGM RTA: CPT | Performed by: OPHTHALMOLOGY

## 2024-06-14 PROCEDURE — 92014 COMPRE OPH EXAM EST PT 1/>: CPT | Performed by: OPHTHALMOLOGY

## 2024-06-14 PROCEDURE — 67028 INJECTION EYE DRUG: CPT | Performed by: OPHTHALMOLOGY

## 2024-06-14 ASSESSMENT — INTRAOCULAR PRESSURE
OD: 15
OS: 16

## 2024-09-20 ENCOUNTER — OFFICE VISIT (OUTPATIENT)
Dept: URBAN - METROPOLITAN AREA CLINIC 41 | Facility: CLINIC | Age: 89
End: 2024-09-20
Payer: MEDICARE

## 2024-09-20 DIAGNOSIS — H35.3114 NEXDTVE AGE-REL MCLR DEGN, R EYE, ADV ATRPC W SBFVL INVOLV: ICD-10-CM

## 2024-09-20 DIAGNOSIS — H53.10 SUBJECTIVE VISUAL DISTURBANCE: ICD-10-CM

## 2024-09-20 DIAGNOSIS — Z96.1 PRESENCE OF INTRAOCULAR LENS: ICD-10-CM

## 2024-09-20 DIAGNOSIS — H35.3221 EXUDATIVE AGE-RELATED MACULAR DEGENERATION, LEFT EYE, WITH ACTIVE CHOROIDAL NEOVASCULARIZATION: Primary | ICD-10-CM

## 2024-09-20 PROCEDURE — 92134 CPTRZ OPH DX IMG PST SGM RTA: CPT | Performed by: OPHTHALMOLOGY

## 2024-09-20 PROCEDURE — 99214 OFFICE O/P EST MOD 30 MIN: CPT | Performed by: OPHTHALMOLOGY

## 2024-09-20 PROCEDURE — 67028 INJECTION EYE DRUG: CPT | Performed by: OPHTHALMOLOGY

## 2024-09-20 ASSESSMENT — INTRAOCULAR PRESSURE
OS: 11
OD: 11

## 2024-12-27 ENCOUNTER — OFFICE VISIT (OUTPATIENT)
Dept: URBAN - METROPOLITAN AREA CLINIC 41 | Facility: CLINIC | Age: 89
End: 2024-12-27
Payer: MEDICARE

## 2024-12-27 DIAGNOSIS — Z96.1 PRESENCE OF INTRAOCULAR LENS: ICD-10-CM

## 2024-12-27 DIAGNOSIS — H35.3221 EXUDATIVE AGE-RELATED MACULAR DEGENERATION, LEFT EYE, WITH ACTIVE CHOROIDAL NEOVASCULARIZATION: Primary | ICD-10-CM

## 2024-12-27 DIAGNOSIS — H53.10 SUBJECTIVE VISUAL DISTURBANCE: ICD-10-CM

## 2024-12-27 DIAGNOSIS — H35.3114 NEXDTVE AGE-REL MCLR DEGN, R EYE, ADV ATRPC W SBFVL INVOLV: ICD-10-CM

## 2024-12-27 PROCEDURE — 67028 INJECTION EYE DRUG: CPT | Performed by: OPHTHALMOLOGY

## 2024-12-27 PROCEDURE — 92134 CPTRZ OPH DX IMG PST SGM RTA: CPT | Performed by: OPHTHALMOLOGY

## 2024-12-27 PROCEDURE — 92014 COMPRE OPH EXAM EST PT 1/>: CPT | Performed by: OPHTHALMOLOGY

## 2024-12-27 ASSESSMENT — INTRAOCULAR PRESSURE
OS: 13
OD: 12

## 2025-03-19 ENCOUNTER — OFFICE VISIT (OUTPATIENT)
Dept: URBAN - METROPOLITAN AREA CLINIC 41 | Facility: CLINIC | Age: 89
End: 2025-03-19
Payer: MEDICARE

## 2025-03-19 DIAGNOSIS — H35.3221 EXUDATIVE AGE-RELATED MACULAR DEGENERATION, LEFT EYE, WITH ACTIVE CHOROIDAL NEOVASCULARIZATION: Primary | ICD-10-CM

## 2025-03-19 DIAGNOSIS — Z96.1 PRESENCE OF INTRAOCULAR LENS: ICD-10-CM

## 2025-03-19 DIAGNOSIS — H35.3114 NEXDTVE AGE-REL MCLR DEGN, R EYE, ADV ATRPC W SBFVL INVOLV: ICD-10-CM

## 2025-03-19 DIAGNOSIS — H53.10 SUBJECTIVE VISUAL DISTURBANCE: ICD-10-CM

## 2025-03-19 PROCEDURE — 67028 INJECTION EYE DRUG: CPT | Performed by: OPHTHALMOLOGY

## 2025-03-19 PROCEDURE — 99214 OFFICE O/P EST MOD 30 MIN: CPT | Performed by: OPHTHALMOLOGY

## 2025-03-19 PROCEDURE — 92134 CPTRZ OPH DX IMG PST SGM RTA: CPT | Performed by: OPHTHALMOLOGY

## 2025-03-19 ASSESSMENT — INTRAOCULAR PRESSURE
OS: 13
OD: 12

## 2025-06-11 ENCOUNTER — OFFICE VISIT (OUTPATIENT)
Dept: URBAN - METROPOLITAN AREA CLINIC 41 | Facility: CLINIC | Age: 89
End: 2025-06-11
Payer: MEDICARE

## 2025-06-11 DIAGNOSIS — H35.3221 EXDTVE AGE-REL MCLR DEGN, LEFT EYE, WITH ACTV CHRDL NEOVAS: Primary | ICD-10-CM

## 2025-06-11 DIAGNOSIS — H53.10 SUBJECTIVE VISUAL DISTURBANCE: ICD-10-CM

## 2025-06-11 DIAGNOSIS — H26.491 OTHER SECONDARY CATARACT, RIGHT EYE: ICD-10-CM

## 2025-06-11 DIAGNOSIS — H35.3114 NEXDTVE AGE-REL MCLR DEGN, R EYE, ADV ATRPC W SBFVL INVOLV: ICD-10-CM

## 2025-06-11 DIAGNOSIS — Z96.1 PRESENCE OF INTRAOCULAR LENS: ICD-10-CM

## 2025-06-11 PROCEDURE — 92134 CPTRZ OPH DX IMG PST SGM RTA: CPT | Performed by: OPHTHALMOLOGY

## 2025-06-11 PROCEDURE — 99212 OFFICE O/P EST SF 10 MIN: CPT | Performed by: OPHTHALMOLOGY

## 2025-06-11 PROCEDURE — 67028 INJECTION EYE DRUG: CPT | Performed by: OPHTHALMOLOGY

## 2025-06-11 ASSESSMENT — INTRAOCULAR PRESSURE
OS: 11
OD: 11